# Patient Record
Sex: MALE | Race: WHITE | Employment: UNEMPLOYED | URBAN - METROPOLITAN AREA
[De-identification: names, ages, dates, MRNs, and addresses within clinical notes are randomized per-mention and may not be internally consistent; named-entity substitution may affect disease eponyms.]

---
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
## 2024-05-13 ENCOUNTER — APPOINTMENT (OUTPATIENT)
Dept: GENERAL RADIOLOGY | Age: 19
End: 2024-05-13
Payer: COMMERCIAL

## 2024-05-13 ENCOUNTER — HOSPITAL ENCOUNTER (EMERGENCY)
Age: 19
Discharge: HOME | End: 2024-05-13
Payer: COMMERCIAL

## 2024-05-13 VITALS
SYSTOLIC BLOOD PRESSURE: 116 MMHG | HEART RATE: 90 BPM | TEMPERATURE: 97.8 F | DIASTOLIC BLOOD PRESSURE: 79 MMHG | RESPIRATION RATE: 14 BRPM | OXYGEN SATURATION: 99 %

## 2024-05-13 DIAGNOSIS — S62.396A CLOSED DISPLACED FRACTURE OF OTHER PART OF FIFTH METACARPAL BONE OF RIGHT HAND, INITIAL ENCOUNTER: Primary | ICD-10-CM

## 2024-05-13 DIAGNOSIS — S60.221A CONTUSION OF RIGHT HAND, INITIAL ENCOUNTER: ICD-10-CM

## 2024-05-13 PROCEDURE — 29125 APPL SHORT ARM SPLINT STATIC: CPT

## 2024-05-13 PROCEDURE — 73130 X-RAY EXAM OF HAND: CPT

## 2024-05-13 PROCEDURE — 99283 EMERGENCY DEPT VISIT LOW MDM: CPT

## 2024-05-13 RX ORDER — NAPROXEN 500 MG/1
500 TABLET ORAL 2 TIMES DAILY
Qty: 30 TABLET | Refills: 0 | Status: SHIPPED | OUTPATIENT
Start: 2024-05-13

## 2024-05-13 NOTE — ED PROVIDER NOTES
Independent [facility]ANY[facility] Visit.     Department of Emergency Medicine   ED  Provider Note  Admit Date/RoomTime: 5/13/2024  5:51 PM  ED Room: [facility]02/    Chief Complaint:   Hand Injury (Rt finger pain. Starting 2 weeks ago )    History of Present Illness      Mino Ghotra is a 19 y.o. old male presents to the emergency department for right hand injury. Patient states he punched a wall about a week and a half ago. He has swelling near his 5th MCP joint. He has full range of motion of fingers and wrist. He denies much pain. He has no numbness/tingling or sensation changes. He has no open wounds. Patient denies any other complaint or concern. He is alert and oriented x3 and in no apparent distress at this exam. He is nontoxic appearing. He is politely declining pain meds at this time.  Patient denies past fracture of hand     PCP: Amrita Don MD  Ortho: None    ROS   Pertinent positives and negatives are stated within HPI, all other systems reviewed and are negative.    Past Medical History:   Past Medical History:   Diagnosis Date    ADHD (attention deficit hyperactivity disorder)      Surgical History: No past surgical history on file.    Social History:  reports that he has never smoked. He has never used smokeless tobacco. He reports current alcohol use. He reports current drug use. Drug: Marijuana (Weed).    Family History: family history includes Cancer in his paternal grandmother; High Blood Pressure in his father, mother, and paternal grandmother; No Known Problems in his brother, brother, brother, brother, brother, and sister.     Allergies: Patient has no known allergies.    Physical Exam     Vitals:    05/13/24 1557 05/13/24 1613   BP:  116/79   Pulse: 88 90   Resp: 16 14   Temp: 97.8 °F (36.6 °C)    TempSrc: Oral    SpO2: 99% 99%       Oxygen Saturation Interpretation: Normal.    Constitutional:  Alert and oriented x3, development consistent with age, NAD  HEENT:  NC/NT. Airway patent.   Neck:  Normal ROM.

## 2024-05-15 NOTE — PROGRESS NOTES
Phoned patient constant busy signal X2.  Believe it is a wrong phone number.  Wait to see if patient calls.

## 2024-06-06 ENCOUNTER — OFFICE VISIT (OUTPATIENT)
Dept: ORTHOPEDIC SURGERY | Age: 19
End: 2024-06-06
Payer: COMMERCIAL

## 2024-06-06 VITALS — HEIGHT: 60 IN | WEIGHT: 82.9 LBS | BODY MASS INDEX: 16.27 KG/M2

## 2024-06-06 DIAGNOSIS — S62.91XD CLOSED FRACTURE OF RIGHT HAND WITH ROUTINE HEALING, SUBSEQUENT ENCOUNTER: ICD-10-CM

## 2024-06-06 DIAGNOSIS — S62.336A DISPLACED FRACTURE OF NECK OF FIFTH METACARPAL BONE, RIGHT HAND, INITIAL ENCOUNTER FOR CLOSED FRACTURE: Primary | ICD-10-CM

## 2024-06-06 PROCEDURE — 99203 OFFICE O/P NEW LOW 30 MIN: CPT | Performed by: STUDENT IN AN ORGANIZED HEALTH CARE EDUCATION/TRAINING PROGRAM

## 2024-06-06 PROCEDURE — G8419 CALC BMI OUT NRM PARAM NOF/U: HCPCS | Performed by: STUDENT IN AN ORGANIZED HEALTH CARE EDUCATION/TRAINING PROGRAM

## 2024-06-06 PROCEDURE — 1036F TOBACCO NON-USER: CPT | Performed by: STUDENT IN AN ORGANIZED HEALTH CARE EDUCATION/TRAINING PROGRAM

## 2024-06-06 PROCEDURE — G8428 CUR MEDS NOT DOCUMENT: HCPCS | Performed by: STUDENT IN AN ORGANIZED HEALTH CARE EDUCATION/TRAINING PROGRAM

## 2024-06-06 NOTE — PROGRESS NOTES
given    Follow up in 4 weeks for repeat xrays    E/M NEW Level 3- Greater than 30 minutes were spent with patient including history, exam, review of imaging (not including taking), discussion of diagnosis and treatment options, answering questions, and documentation.           Gigi Crowder  Orthopaedic Surgery   6/6/24  9:29 AM

## 2025-06-15 ENCOUNTER — HOSPITAL ENCOUNTER (EMERGENCY)
Age: 20
Discharge: HOME | End: 2025-06-15
Attending: EMERGENCY MEDICINE
Payer: OTHER MISCELLANEOUS

## 2025-06-15 ENCOUNTER — APPOINTMENT (OUTPATIENT)
Dept: CT IMAGING | Age: 20
End: 2025-06-15
Attending: EMERGENCY MEDICINE
Payer: OTHER MISCELLANEOUS

## 2025-06-15 VITALS
WEIGHT: 128 LBS | BODY MASS INDEX: 18.32 KG/M2 | HEIGHT: 70 IN | OXYGEN SATURATION: 99 % | DIASTOLIC BLOOD PRESSURE: 83 MMHG | RESPIRATION RATE: 16 BRPM | SYSTOLIC BLOOD PRESSURE: 120 MMHG | TEMPERATURE: 98.2 F | HEART RATE: 90 BPM
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
## 2025-06-15 DIAGNOSIS — S09.90XA CLOSED HEAD INJURY, INITIAL ENCOUNTER: ICD-10-CM

## 2025-06-15 PROCEDURE — 70450 CT HEAD/BRAIN W/O DYE: CPT

## 2025-06-15 PROCEDURE — 72125 CT NECK SPINE W/O DYE: CPT

## 2025-06-15 PROCEDURE — 6370000000 HC RX 637 (ALT 250 FOR IP): Performed by: EMERGENCY MEDICINE

## 2025-06-15 PROCEDURE — 99284 EMERGENCY DEPT VISIT MOD MDM: CPT

## 2025-06-15 RX ORDER — ACETAMINOPHEN 325 MG/1
650 TABLET ORAL ONCE
Status: COMPLETED | OUTPATIENT
Start: 2025-06-15 | End: 2025-06-15

## 2025-06-15 RX ADMIN — ACETAMINOPHEN 650 MG: 325 TABLET ORAL at 08:46

## 2025-06-15 ASSESSMENT — PAIN DESCRIPTION - LOCATION
LOCATION: HEAD
LOCATION: HEAD

## 2025-06-15 ASSESSMENT — PAIN - FUNCTIONAL ASSESSMENT
PAIN_FUNCTIONAL_ASSESSMENT: 0-10
PAIN_FUNCTIONAL_ASSESSMENT: ACTIVITIES ARE NOT PREVENTED

## 2025-06-15 ASSESSMENT — PAIN DESCRIPTION - DESCRIPTORS
DESCRIPTORS: ACHING
DESCRIPTORS: ACHING

## 2025-06-15 ASSESSMENT — PAIN DESCRIPTION - ORIENTATION: ORIENTATION: RIGHT;LEFT

## 2025-06-15 ASSESSMENT — PAIN SCALES - GENERAL
PAINLEVEL_OUTOF10: 7
PAINLEVEL_OUTOF10: 7

## 2025-06-15 ASSESSMENT — PAIN DESCRIPTION - PAIN TYPE: TYPE: ACUTE PAIN

## 2025-06-15 ASSESSMENT — PAIN DESCRIPTION - FREQUENCY: FREQUENCY: CONTINUOUS

## 2025-06-15 ASSESSMENT — PAIN DESCRIPTION - ONSET: ONSET: SUDDEN
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
